# Patient Record
Sex: FEMALE | Race: WHITE | NOT HISPANIC OR LATINO | Employment: OTHER | URBAN - METROPOLITAN AREA
[De-identification: names, ages, dates, MRNs, and addresses within clinical notes are randomized per-mention and may not be internally consistent; named-entity substitution may affect disease eponyms.]

---

## 2021-08-10 ENCOUNTER — VBI (OUTPATIENT)
Dept: ADMINISTRATIVE | Facility: OTHER | Age: 51
End: 2021-08-10

## 2021-08-10 NOTE — TELEPHONE ENCOUNTER
Upon review of the In Basket request we were able to locate, review, and update the patient chart as requested for Pap Smear (HPV) aka Cervical Cancer Screening  Any additional questions or concerns should be emailed to the Practice Liaisons via [email or web address] email, please do not reply via In Basket      Thank you  Desiree Roldan

## 2023-10-18 NOTE — PROGRESS NOTES
Assessment/Plan:    Encounter for gynecological examination (general) (routine) without abnormal findings  Here for well check, no breast or gyn complaints. Normal breast and pelvic exams. Last pap 10/2020 neg/HPV neg; repeated today  Mammo order given, last 2/2023 [facility] per pt report  Cologuard 2023, due 2026 with PCP  Dexa @65, no risk factors. Calcium recs reviewed. GI # given for consideration of hemorrhoidal banding. Diagnoses and all orders for this visit:    Encounter for gynecological examination (general) (routine) without abnormal findings    Encounter for screening mammogram for malignant neoplasm of breast  -     Mammo screening bilateral w 3d & cad; Future    Screening for malignant neoplasm of the cervix  -     IGP, Aptima HPV, Rfx 16/18,45    Other orders  -     Liquid-based pap, screening          Subjective:      Patient ID: Mala Magana is a 48 y.o. female. HPI Here for well check, last seen 10/19/2020      The following portions of the patient's history were reviewed and updated as appropriate: She  has a past medical history of COVID-19 (09/2020), Obesity, Papanicolaou smear for cervical cancer screening (2020), and Thyroid nodule (2015). She   Patient Active Problem List    Diagnosis Date Noted    Encounter for gynecological examination (general) (routine) without abnormal findings 10/19/2023     She  has a past surgical history that includes Gynecologic cryosurgery (2002) and Mammo (historical) (2016). Her family history includes Uterine cancer in her other and paternal grandmother. She  reports that she has never smoked. She does not have any smokeless tobacco history on file. She reports current alcohol use. She reports that she does not use drugs. No current outpatient medications on file. No current facility-administered medications for this visit. She has No Known Allergies. .    Review of Systems  No PMB, breast, bladder, bowel changes.  No new persistent pain,

## 2023-10-19 ENCOUNTER — OFFICE VISIT (OUTPATIENT)
Dept: OBGYN CLINIC | Facility: CLINIC | Age: 53
End: 2023-10-19
Payer: COMMERCIAL

## 2023-10-19 VITALS
BODY MASS INDEX: 47.57 KG/M2 | HEIGHT: 63 IN | DIASTOLIC BLOOD PRESSURE: 86 MMHG | SYSTOLIC BLOOD PRESSURE: 128 MMHG | WEIGHT: 268.5 LBS

## 2023-10-19 DIAGNOSIS — Z12.4 SCREENING FOR MALIGNANT NEOPLASM OF THE CERVIX: ICD-10-CM

## 2023-10-19 DIAGNOSIS — Z01.419 ENCOUNTER FOR GYNECOLOGICAL EXAMINATION (GENERAL) (ROUTINE) WITHOUT ABNORMAL FINDINGS: Primary | ICD-10-CM

## 2023-10-19 DIAGNOSIS — Z12.31 ENCOUNTER FOR SCREENING MAMMOGRAM FOR MALIGNANT NEOPLASM OF BREAST: ICD-10-CM

## 2023-10-19 PROCEDURE — S0610 ANNUAL GYNECOLOGICAL EXAMINA: HCPCS | Performed by: OBSTETRICS & GYNECOLOGY

## 2023-10-19 NOTE — LETTER
October 19, 2023     Peter Stevens MD  [address] [place] [address] [address]    Patient: Óscar Batista   YOB: 1970   Date of Visit: 10/19/2023       Dear Dr. Thomas Bernal:    Óscar Batista was in today for her annual gyn exam. Below are my notes for this consultation. If you have questions, please do not hesitate to call me. I look forward to following your patient along with you. Sincerely,        Dominick Courtney MD        CC: No Recipients    Dominick Courtney MD  10/19/2023  2:35 PM  Sign when Signing Visit  Assessment/Plan:    Encounter for gynecological examination (general) (routine) without abnormal findings  Here for well check, no breast or gyn complaints. Normal breast and pelvic exams. Last pap 10/2020 neg/HPV neg; repeated today  Mammo order given, last 2/2023 [facility] per pt report  Cologuard 2023, due 2026 with PCP  Dexa @65, no risk factors. Calcium recs reviewed. GI # given for consideration of hemorrhoidal banding. Diagnoses and all orders for this visit:    Encounter for gynecological examination (general) (routine) without abnormal findings    Encounter for screening mammogram for malignant neoplasm of breast  -     Mammo screening bilateral w 3d & cad; Future    Screening for malignant neoplasm of the cervix  -     IGP, Aptima HPV, Rfx 16/18,45    Other orders  -     Liquid-based pap, screening          Subjective:      Patient ID: Óscar Batista is a 48 y.o. female. HPI Here for well check, last seen 10/19/2020      The following portions of the patient's history were reviewed and updated as appropriate: She  has a past medical history of COVID-19 (09/2020), Obesity, Papanicolaou smear for cervical cancer screening (2020), and Thyroid nodule (2015).   She   Patient Active Problem List    Diagnosis Date Noted   • Encounter for gynecological examination (general) (routine) without abnormal findings 10/19/2023     She  has a past surgical history that includes Gynecologic cryosurgery (2002) and Mammo (historical) (2016). Her family history includes Uterine cancer in her other and paternal grandmother. She  reports that she has never smoked. She does not have any smokeless tobacco history on file. She reports current alcohol use. She reports that she does not use drugs. No current outpatient medications on file. No current facility-administered medications for this visit. She has No Known Allergies. .    Review of Systems  No PMB, breast, bladder, bowel changes.  No new persistent pain, bloating, early satiety or pelvic pressure  No sig menopause symptoms    Objective:      /86 (BP Location: Left arm, Patient Position: Sitting, Cuff Size: Standard)   Ht 5' 3" (1.6 m)   Wt 122 kg (268 lb 8 oz)   LMP 04/01/2020 (Approximate)   Breastfeeding No   BMI 47.56 kg/m²          Physical Exam    General appearance: no distress, pleasant  Neck: thyroid without nodules or thyromegaly, no palpable adenopathy  Lymph nodes: no palpable adenopathy  Breasts: no masses, nodes or skin changes  Abdomen: soft, non tender, no palpable masses  Pelvic exam: normal atrophic external genitalia, urethral meatus normal, vagina atrophic without lesions, small rectocele, cervix atrophic without lesions, bimanual limited due to habitus, uterus small, non tender, no adnexal masses, non tender  Rectal exam: normal sphincter tone, no masses, +small hemorrhoid,  RV confirms above

## 2023-10-19 NOTE — ASSESSMENT & PLAN NOTE
Here for well check, no breast or gyn complaints. Normal breast and pelvic exams. Last pap 10/2020 neg/HPV neg; repeated today  Mammo order given, last 2/2023 [facility] per pt report  Cologuard 2023, due 2026 with PCP  Bianca @65, no risk factors. Calcium recs reviewed. GI # given for consideration of hemorrhoidal banding.

## 2023-10-19 NOTE — PATIENT INSTRUCTIONS
Return to office in one year unless having any problems such as breast changes, bleeding, new persistent pain, new progressive bloating, new problems eating (getting full to quickly) or new constant urinary pressure that does not resolve in one week. Call in six months to schedule your annual visit. Continue to strive for 1200 mg of calcium and 1000 IU of vitamin D daily in diet and supplements combined for your bone health. You can only absorb 600 mg of calcium at a time. Avoid excess calcium as this may adversely effect your heart. There are 400 mg of calcium in an 8 oz serving of dairy. We recommend either [facility]: [id number] or [address] [place] [address] [phone] for the hemorrhoid banding.

## 2023-10-25 LAB
CYTOLOGIST CVX/VAG CYTO: NORMAL
DX ICD CODE: NORMAL
HPV GENOTYPE REFLEX: NORMAL
HPV I/H RISK 4 DNA CVX QL PROBE+SIG AMP: NEGATIVE
OTHER STN SPEC: NORMAL
PATH REPORT.FINAL DX SPEC: NORMAL
SL AMB NOTE:: NORMAL
SL AMB SPECIMEN ADEQUACY: NORMAL
SL AMB TEST METHODOLOGY: NORMAL

## 2023-12-18 PROBLEM — Z01.419 ENCOUNTER FOR GYNECOLOGICAL EXAMINATION (GENERAL) (ROUTINE) WITHOUT ABNORMAL FINDINGS: Status: RESOLVED | Noted: 2023-10-19 | Resolved: 2023-12-18

## 2024-12-09 NOTE — PROGRESS NOTES
Assessment/Plan:    Encounter for gynecological examination (general) (routine) without abnormal findings  Here for well check, no breast or gyn complaints.   Normal breast and pelvic exams.  Last pap 1/2023 neg/HPV neg; due 2028  Mammo order given, overdue. Agrees to schedule.   Cologuard 2023, due 2026 with PCP  Dexa @65, no risk factors; calcium recs given.    Family history of uterine cancer  Discussed PMB as warning sign to monitor for and to RTO if occurs.   Option for genetic counseling with two paternal relatives with uterine cancer.   Interested, referral placed.     BMI 45.0-49.9, adult (HCC)  Frustrated with weight, now with joint pain.   Interested in medications, assistance.   Offered and interested in medical weight loss evaluation, referral placed.       Diagnoses and all orders for this visit:    Encounter for screening mammogram for malignant neoplasm of breast  -     Mammo screening bilateral w 3d and cad; Future    Encounter for gynecological examination (general) (routine) without abnormal findings    Family history of uterine cancer  -     Ambulatory Referral to Oncology Genetics; Future    BMI 45.0-49.9, adult (HCC)  -     Ambulatory Referral to Weight Management; Future          Subjective:      Patient ID: Kamini Clement is a 54 y.o. female.    HPI Here for well check.    The following portions of the patient's history were reviewed and updated as appropriate: She  has a past medical history of BMI 45.0-49.9, adult (HCC), COVID-19 (09/2020), and Thyroid nodule (2015).  She   Patient Active Problem List    Diagnosis Date Noted    BMI 45.0-49.9, adult (HCC) 12/12/2024    Family history of uterine cancer 12/12/2024     She  has a past surgical history that includes Gynecologic cryosurgery (2002) and Mammo (historical) (2016).  Her family history includes Uterine cancer in her paternal grandmother and another family member.  She  reports that she has never smoked. She does not have any  "smokeless tobacco history on file. She reports current alcohol use. She reports that she does not use drugs.  No current outpatient medications on file.     No current facility-administered medications for this visit.     She has no known allergies..    Review of Systems  No PMB, breast, bladder, bowel changes. No new persistent pain, bloating, early satiety or pelvic pressure    Objective:    /96 (BP Location: Left arm, Patient Position: Sitting, Cuff Size: Large)   Ht 5' 3\" (1.6 m)   Wt 119 kg (263 lb)   LMP 04/01/2020 (Approximate)   BMI 46.59 kg/m²      Physical Exam    General appearance: no distress, pleasant  Neck: thyroid without nodules or thyromegaly, no palpable adenopathy  Lymph nodes: no palpable adenopathy  Breasts: no masses, nodes or skin changes  Abdomen: soft, non tender, no palpable masses  Pelvic exam: normal atrophic external genitalia, urethral meatus normal, vagina atrophic with small rectocele as in past, cervix atrophic without lesions, uterus small, non tender, no adnexal masses, non tender  Rectal exam: normal sphincter tone, no masses, RV confirms above    "

## 2024-12-12 ENCOUNTER — ANNUAL EXAM (OUTPATIENT)
Dept: OBGYN CLINIC | Facility: CLINIC | Age: 54
End: 2024-12-12
Payer: COMMERCIAL

## 2024-12-12 VITALS
DIASTOLIC BLOOD PRESSURE: 96 MMHG | SYSTOLIC BLOOD PRESSURE: 156 MMHG | HEIGHT: 63 IN | BODY MASS INDEX: 46.6 KG/M2 | WEIGHT: 263 LBS

## 2024-12-12 DIAGNOSIS — Z80.49 FAMILY HISTORY OF UTERINE CANCER: ICD-10-CM

## 2024-12-12 DIAGNOSIS — Z12.31 ENCOUNTER FOR SCREENING MAMMOGRAM FOR MALIGNANT NEOPLASM OF BREAST: ICD-10-CM

## 2024-12-12 DIAGNOSIS — Z01.419 ENCOUNTER FOR GYNECOLOGICAL EXAMINATION (GENERAL) (ROUTINE) WITHOUT ABNORMAL FINDINGS: Primary | ICD-10-CM

## 2024-12-12 PROCEDURE — S0612 ANNUAL GYNECOLOGICAL EXAMINA: HCPCS | Performed by: OBSTETRICS & GYNECOLOGY

## 2024-12-12 NOTE — LETTER
December 12, 2024     Shereen Topete MD  [address] [address]    Patient: Kamini Clement   YOB: 1970   Date of Visit: 12/12/2024       Dear Dr. Topete:    Kamini Clement was in today for her annual gyn exam. Below are my notes for this visit.    If you have questions, please do not hesitate to call me. I look forward to following your patient along with you.         Sincerely,        Jess Delarosa MD        CC: No Recipients    Jess Delarosa MD  12/12/2024  1:48 PM  Sign when Signing Visit  Assessment/Plan:    Encounter for gynecological examination (general) (routine) without abnormal findings  Here for well check, no breast or gyn complaints.   Normal breast and pelvic exams.  Last pap 1/2023 neg/HPV neg; due 2028  Mammo order given, overdue. Agrees to schedule.   Cologuard 2023, due 2026 with PCP  Dexa @65, no risk factors; calcium recs given.    Family history of uterine cancer  Discussed PMB as warning sign to monitor for and to RTO if occurs.   Option for genetic counseling with two paternal relatives with uterine cancer.   Interested, referral placed.     BMI 45.0-49.9, adult (HCC)  Frustrated with weight, now with joint pain.   Interested in medications, assistance.   Offered and interested in medical weight loss evaluation, referral placed.       Diagnoses and all orders for this visit:    Encounter for screening mammogram for malignant neoplasm of breast  -     Mammo screening bilateral w 3d and cad; Future    Encounter for gynecological examination (general) (routine) without abnormal findings    Family history of uterine cancer  -     Ambulatory Referral to Oncology Genetics; Future    BMI 45.0-49.9, adult (HCC)  -     Ambulatory Referral to Weight Management; Future          Subjective:      Patient ID: Kamini Clement is a 54 y.o. female.    HPI Here for well check.    The following portions of the patient's history were reviewed and updated as  "appropriate: She  has a past medical history of BMI 45.0-49.9, adult ([facility]), COVID-19 (09/2020), and Thyroid nodule (2015).  She   Patient Active Problem List    Diagnosis Date Noted   • BMI 45.0-49.9, adult (HCC) 12/12/2024   • Family history of uterine cancer 12/12/2024     She  has a past surgical history that includes Gynecologic cryosurgery (2002) and Mammo (historical) (2016).  Her family history includes Uterine cancer in her paternal grandmother and another family member.  She  reports that she has never smoked. She does not have any smokeless tobacco history on file. She reports current alcohol use. She reports that she does not use drugs.  No current outpatient medications on file.     No current facility-administered medications for this visit.     She has no known allergies..    Review of Systems  No PMB, breast, bladder, bowel changes. No new persistent pain, bloating, early satiety or pelvic pressure    Objective:    /96 (BP Location: Left arm, Patient Position: Sitting, Cuff Size: Large)   Ht 5' 3\" (1.6 m)   Wt 119 kg (263 lb)   LMP 04/01/2020 (Approximate)   BMI 46.59 kg/m²      Physical Exam    General appearance: no distress, pleasant  Neck: thyroid without nodules or thyromegaly, no palpable adenopathy  Lymph nodes: no palpable adenopathy  Breasts: no masses, nodes or skin changes  Abdomen: soft, non tender, no palpable masses  Pelvic exam: normal atrophic external genitalia, urethral meatus normal, vagina atrophic with small rectocele as in past, cervix atrophic without lesions, uterus small, non tender, no adnexal masses, non tender  Rectal exam: normal sphincter tone, no masses, RV confirms above    "

## 2024-12-12 NOTE — ASSESSMENT & PLAN NOTE
Frustrated with weight, now with joint pain.   Interested in medications, assistance.   Offered and interested in medical weight loss evaluation, referral placed.

## 2024-12-12 NOTE — PATIENT INSTRUCTIONS
Call for your appointment with genetics at [phone].    Return to office in one year unless having any problems such as breast changes, bleeding, new persistent pain, new progressive bloating, new problems eating (getting full to quickly) or new constant urinary pressure that does not resolve in one week.    Continue to strive for 1200 mg of calcium and 1000 IU of vitamin D daily in diet and supplements combined for your bone health.  You can only absorb 600 mg of calcium at a time. Avoid excess calcium as this may adversely effect your heart.  There are 400 mg of calcium in an 8 oz serving of dairy.  [facility] milk has 600 mg of calcium in an 8 oz serving.

## 2024-12-12 NOTE — ASSESSMENT & PLAN NOTE
Discussed PMB as warning sign to monitor for and to RTO if occurs.   Option for genetic counseling with two paternal relatives with uterine cancer.   Interested, referral placed.

## 2024-12-12 NOTE — ASSESSMENT & PLAN NOTE
Here for well check, no breast or gyn complaints.   Normal breast and pelvic exams.  Last pap 1/2023 neg/HPV neg; due 2028  Mammo order given, overdue. Agrees to schedule.   Cologuard 2023, due 2026 with PCP  Dexa @65, no risk factors; calcium recs given.

## 2025-03-31 ENCOUNTER — HOSPITAL ENCOUNTER (OUTPATIENT)
Dept: HOSPITAL 99 - RAD | Age: 55
End: 2025-03-31
Payer: COMMERCIAL

## 2025-03-31 DIAGNOSIS — R05.3: Primary | ICD-10-CM

## 2025-04-16 ENCOUNTER — HOSPITAL ENCOUNTER (OUTPATIENT)
Dept: MAMMOGRAPHY | Facility: IMAGING CENTER | Age: 55
Discharge: HOME | End: 2025-04-16
Payer: COMMERCIAL

## 2025-04-16 VITALS — HEIGHT: 63 IN | BODY MASS INDEX: 46.6 KG/M2 | WEIGHT: 263 LBS

## 2025-04-16 DIAGNOSIS — Z12.31 ENCOUNTER FOR SCREENING MAMMOGRAM FOR MALIGNANT NEOPLASM OF BREAST: ICD-10-CM

## 2025-04-16 PROCEDURE — 77063 BREAST TOMOSYNTHESIS BI: CPT

## 2025-04-16 PROCEDURE — 77067 SCR MAMMO BI INCL CAD: CPT

## 2025-04-25 ENCOUNTER — RESULTS FOLLOW-UP (OUTPATIENT)
Dept: OBGYN CLINIC | Facility: CLINIC | Age: 55
End: 2025-04-25

## 2025-04-28 NOTE — TELEPHONE ENCOUNTER
Spoke with patient regarding provider note. She is requesting result be sent to her PCP.  Transferred to medical records for assistance.

## 2025-07-03 ENCOUNTER — HOSPITAL ENCOUNTER (OUTPATIENT)
Dept: MAMMOGRAPHY | Facility: CLINIC | Age: 55
Discharge: HOME | End: 2025-07-03
Attending: OBSTETRICS & GYNECOLOGY
Payer: COMMERCIAL

## 2025-07-03 ENCOUNTER — HOSPITAL ENCOUNTER (OUTPATIENT)
Dept: ULTRASOUND IMAGING | Facility: CLINIC | Age: 55
Discharge: HOME | End: 2025-07-03
Attending: OBSTETRICS & GYNECOLOGY
Payer: COMMERCIAL

## 2025-07-03 DIAGNOSIS — R92.8 ABNORMAL SCREENING MAMMOGRAM: ICD-10-CM

## 2025-07-03 PROCEDURE — 77065 DX MAMMO INCL CAD UNI: CPT

## 2025-07-03 PROCEDURE — G0279 TOMOSYNTHESIS, MAMMO: HCPCS

## 2025-07-03 PROCEDURE — 76642 ULTRASOUND BREAST LIMITED: CPT
